# Patient Record
Sex: FEMALE | Race: OTHER
[De-identification: names, ages, dates, MRNs, and addresses within clinical notes are randomized per-mention and may not be internally consistent; named-entity substitution may affect disease eponyms.]

---

## 2019-12-09 ENCOUNTER — HOSPITAL ENCOUNTER (EMERGENCY)
Dept: HOSPITAL 72 - EMR | Age: 39
Discharge: HOME | End: 2019-12-09
Payer: SELF-PAY

## 2019-12-09 VITALS — SYSTOLIC BLOOD PRESSURE: 146 MMHG | DIASTOLIC BLOOD PRESSURE: 97 MMHG

## 2019-12-09 VITALS — BODY MASS INDEX: 22.66 KG/M2 | WEIGHT: 120 LBS | HEIGHT: 61 IN

## 2019-12-09 VITALS — SYSTOLIC BLOOD PRESSURE: 131 MMHG | DIASTOLIC BLOOD PRESSURE: 102 MMHG

## 2019-12-09 DIAGNOSIS — O03.4: Primary | ICD-10-CM

## 2019-12-09 LAB
ADD MANUAL DIFF: NO
ALBUMIN SERPL-MCNC: 4.1 G/DL (ref 3.4–5)
ALBUMIN/GLOB SERPL: 1.1 {RATIO} (ref 1–2.7)
ALP SERPL-CCNC: 60 U/L (ref 46–116)
ALT SERPL-CCNC: 23 U/L (ref 12–78)
ANION GAP SERPL CALC-SCNC: 16 MMOL/L (ref 5–15)
APPEARANCE UR: (no result)
APTT PPP: (no result) S
AST SERPL-CCNC: 17 U/L (ref 15–37)
BASOPHILS NFR BLD AUTO: 0.7 % (ref 0–2)
BILIRUB SERPL-MCNC: 0.3 MG/DL (ref 0.2–1)
BUN SERPL-MCNC: 11 MG/DL (ref 7–18)
CALCIUM SERPL-MCNC: 9 MG/DL (ref 8.5–10.1)
CHLORIDE SERPL-SCNC: 103 MMOL/L (ref 98–107)
CO2 SERPL-SCNC: 21 MMOL/L (ref 21–32)
CREAT SERPL-MCNC: 0.6 MG/DL (ref 0.55–1.3)
EOSINOPHIL NFR BLD AUTO: 3.4 % (ref 0–3)
ERYTHROCYTE [DISTWIDTH] IN BLOOD BY AUTOMATED COUNT: 11.3 % (ref 11.6–14.8)
GLOBULIN SER-MCNC: 3.7 G/DL
GLUCOSE UR STRIP-MCNC: NEGATIVE MG/DL
HCT VFR BLD CALC: 40.3 % (ref 37–47)
HGB BLD-MCNC: 13.7 G/DL (ref 12–16)
KETONES UR QL STRIP: (no result)
LEUKOCYTE ESTERASE UR QL STRIP: (no result)
LYMPHOCYTES NFR BLD AUTO: 42.6 % (ref 20–45)
MCV RBC AUTO: 95 FL (ref 80–99)
MONOCYTES NFR BLD AUTO: 6 % (ref 1–10)
NEUTROPHILS NFR BLD AUTO: 47.2 % (ref 45–75)
NITRITE UR QL STRIP: NEGATIVE
PH UR STRIP: 5 [PH] (ref 4.5–8)
PLATELET # BLD: 320 K/UL (ref 150–450)
POTASSIUM SERPL-SCNC: 3 MMOL/L (ref 3.5–5.1)
PROT UR QL STRIP: (no result)
RBC # BLD AUTO: 4.26 M/UL (ref 4.2–5.4)
SODIUM SERPL-SCNC: 140 MMOL/L (ref 136–145)
SP GR UR STRIP: 1.02 (ref 1–1.03)
UROBILINOGEN UR-MCNC: NORMAL MG/DL (ref 0–1)
WBC # BLD AUTO: 9.3 K/UL (ref 4.8–10.8)

## 2019-12-09 PROCEDURE — 76856 US EXAM PELVIC COMPLETE: CPT

## 2019-12-09 PROCEDURE — 81025 URINE PREGNANCY TEST: CPT

## 2019-12-09 PROCEDURE — 85025 COMPLETE CBC W/AUTO DIFF WBC: CPT

## 2019-12-09 PROCEDURE — 80307 DRUG TEST PRSMV CHEM ANLYZR: CPT

## 2019-12-09 PROCEDURE — 76830 TRANSVAGINAL US NON-OB: CPT

## 2019-12-09 PROCEDURE — 36415 COLL VENOUS BLD VENIPUNCTURE: CPT

## 2019-12-09 PROCEDURE — 84702 CHORIONIC GONADOTROPIN TEST: CPT

## 2019-12-09 PROCEDURE — 83690 ASSAY OF LIPASE: CPT

## 2019-12-09 PROCEDURE — 81003 URINALYSIS AUTO W/O SCOPE: CPT

## 2019-12-09 PROCEDURE — 96360 HYDRATION IV INFUSION INIT: CPT

## 2019-12-09 PROCEDURE — 80053 COMPREHEN METABOLIC PANEL: CPT

## 2019-12-09 PROCEDURE — 99284 EMERGENCY DEPT VISIT MOD MDM: CPT

## 2019-12-09 NOTE — EMERGENCY ROOM REPORT
History of Present Illness


General


Chief Complaint:  Vaginal


Source:  Patient





Present Illness


HPI


Patient presents with complaints of increased heavy vaginal bleeding


Patient reports that the bleeding started several hours ago


Patient reports that she had a miscarriage about 3 months ago saw her


OB/GYN physician was given an injection and has a follow-up in 2 weeks


Her last menstrual cycle was about 3 months ago and she has not had any


Bleeding since then





Denies any abdominal pain denies any vomiting


Denies any back or flank pain denies any dysuria frequency


Allergies:  


Coded Allergies:  


     No Known Allergies (Unverified , 12)





Patient History


Past Medical History:  see triage record


Last Menstrual Period:  now


Pregnant Now:  No


:  5


Para:  4


Reviewed Nursing Documentation:  PMH: Agreed; PSxH: Agreed





Review of Systems


All Other Systems:  negative except mentioned in HPI





Physical Exam





Vital Signs








  Date Time  Temp Pulse Resp B/P (MAP) Pulse Ox O2 Delivery O2 Flow Rate FiO2


 


19 05:19 97.9 115 16 138/88 (105) 98 Room Air  








Sp02 EP Interpretation:  reviewed, normal


General Appearance:  mild distress - Appears uncomfortable


Head:  normocephalic, atraumatic


Eyes:  bilateral eye PERRL, bilateral eye EOMI


ENT:  hearing grossly normal


Neck:  full range of motion, supple


Respiratory:  lungs clear, no respiratory distress, no retraction


Cardiovascular #1:  regular rate, rhythm


Gastrointestinal:  normal bowel sounds, non tender


Genitourinary:  other - Pelvic exam reveals several clots, osseous closed, 

after the clots are removed and packing was used for heme control, there does 

not appear to be any obvious hemorrhage


Musculoskeletal:  normal inspection


Neurologic:  alert, oriented x3


Psychiatric:  normal inspection


Skin:  no rash


Lymphatic:  no adenopathy





Medical Decision Making


Diagnostic Impression:  


 Primary Impression:  


 Incomplete miscarriage


 Additional Impression:  


 Retained products of conception


ER Course


With the patient's history and examination, multiple differentials considered, 

including but not limited to pregnancy, ectopic pregnancy, ovarian torsion, 

gastritis, cholecystitis, pancreatitis, appendicitis


Other differential such as retained products and incomplete miscarriage 

considered given the patient's history





Patient's ultrasound shows some thickening of the lining of the uterus with 

question of retained products





Patient's pregnancy test is negative otherwise on the pelvic exam


Patient shows reduction of active bleeding





At this time patient's hemoglobin also within normal limits hemodynamically 

significantly improved with heart rate in the 80s


And patient requires close OB/GYN follow-up with her specialist


In the next 1 to 2 days





Labs








Test


  19


05:39 19


05:51


 


White Blood Count


  9.3 K/UL


(4.8-10.8) 


 


 


Red Blood Count


  4.26 M/UL


(4.20-5.40) 


 


 


Hemoglobin


  13.7 G/DL


(12.0-16.0) 


 


 


Hematocrit


  40.3 %


(37.0-47.0) 


 


 


Mean Corpuscular Volume 95 FL (80-99)  


 


Mean Corpuscular Hemoglobin


  32.3 PG


(27.0-31.0) 


 


 


Mean Corpuscular Hemoglobin


Concent 34.1 G/DL


(32.0-36.0) 


 


 


Red Cell Distribution Width


  11.3 %


(11.6-14.8) 


 


 


Platelet Count


  320 K/UL


(150-450) 


 


 


Mean Platelet Volume


  5.6 FL


(6.5-10.1) 


 


 


Neutrophils (%) (Auto)


  47.2 %


(45.0-75.0) 


 


 


Lymphocytes (%) (Auto)


  42.6 %


(20.0-45.0) 


 


 


Monocytes (%) (Auto)


  6.0 %


(1.0-10.0) 


 


 


Eosinophils (%) (Auto)


  3.4 %


(0.0-3.0) 


 


 


Basophils (%) (Auto)


  0.7 %


(0.0-2.0) 


 


 


Sodium Level


  140 MMOL/L


(136-145) 


 


 


Potassium Level


  3.0 MMOL/L


(3.5-5.1) 


 


 


Chloride Level


  103 MMOL/L


() 


 


 


Carbon Dioxide Level


  21 MMOL/L


(21-32) 


 


 


Anion Gap


  16 mmol/L


(5-15) 


 


 


Blood Urea Nitrogen


  11 mg/dL


(7-18) 


 


 


Creatinine


  0.6 MG/DL


(0.55-1.30) 


 


 


Estimat Glomerular Filtration


Rate > 60 mL/min


(>60) 


 


 


Glucose Level


  108 MG/DL


() 


 


 


Calcium Level


  9.0 MG/DL


(8.5-10.1) 


 


 


Total Bilirubin


  0.3 MG/DL


(0.2-1.0) 


 


 


Aspartate Amino Transf


(AST/SGOT) 17 U/L (15-37) 


  


 


 


Alanine Aminotransferase


(ALT/SGPT) 23 U/L (12-78) 


  


 


 


Alkaline Phosphatase


  60 U/L


() 


 


 


Total Protein


  7.8 G/DL


(6.4-8.2) 


 


 


Albumin


  4.1 G/DL


(3.4-5.0) 


 


 


Globulin 3.7 g/dL  


 


Albumin/Globulin Ratio 1.1 (1.0-2.7)  


 


Lipase


  198 U/L


() 


 


 


Human Chorionic Gonadotropin,


Quant 10 mIU/mL


(1-6) 


 


 


Serum Alcohol 3 mg/dL  


 


Urine Color  Red 


 


Urine Appearance  Cloudy 


 


Urine pH  5 (4.5-8.0) 


 


Urine Specific Gravity


  


  1.025


(1.005-1.035)


 


Urine Protein  3+ (NEGATIVE) 


 


Urine Glucose (UA)


  


  Negative


(NEGATIVE)


 


Urine Ketones  1+ (NEGATIVE) 


 


Urine Blood  5+ (NEGATIVE) 


 


Urine Nitrite


  


  Negative


(NEGATIVE)


 


Urine Bilirubin


  


  Negative


(NEGATIVE)


 


Urine Urobilinogen


  


  Normal MG/DL


(0.0-1.0)


 


Urine Leukocyte Esterase  1+ (NEGATIVE) 


 


Urine RBC


  


  Tntc /HPF (0 -


2)


 


Urine WBC


  


  0-2 /HPF (0 -


2)


 


Urine Squamous Epithelial


Cells 


  None /LPF


(NONE/OCC)


 


Urine Bacteria


  


  Occasional


/HPF (NONE)


 


Urine HCG, Qualitative


  


  Negative


(NEGATIVE)


 


Urine Opiates Screen


  


  Negative


(NEGATIVE)


 


Urine Barbiturates Screen


  


  Negative


(NEGATIVE)


 


Phencyclidine (PCP) Screen


  


  Negative


(NEGATIVE)


 


Urine Amphetamines Screen


  


  Negative


(NEGATIVE)


 


Urine Benzodiazepines Screen


  


  Negative


(NEGATIVE)


 


Urine Cocaine Screen


  


  Negative


(NEGATIVE)


 


Urine Marijuana (THC) Screen


  


  Negative


(NEGATIVE)








CT/MRI/US Diagnostic Results


CT/MRI/US Diagnostic Results :  


   Impression


Pelvic ultrasoundImpression: Thickened endometrium with mixed echogenicity, 

cystic spaces, and


increased vascularity, raises concern for retained products of conception.


 


No intrauterine pregnancy demonstrated; most likely elevated beta-hCG related to


recent pregnancy but correlation with serial beta-hCGs is recommended, with


consideration for repeat sonography as clinically indicated


 


Negative for adnexal mass





Last Vital Signs








  Date Time  Temp Pulse Resp B/P (MAP) Pulse Ox O2 Delivery O2 Flow Rate FiO2


 


19 05:19 97.9 115 16 138/88 (105) 98 Room Air  








Status:  improved


Disposition:  HOME, SELF-CARE


Condition:  Improved


Scripts


Cephalexin* (KEFLEX*) 500 Mg Capsule


500 MG ORAL EVERY 6 HOURS for 7 Days, CAP


   Prov: Marisela Bledsoe DO         19





Additional Instructions:  


Patient is provided with the discharge instructions notified to follow up with 

primary doctor in the next 2-3 days otherwise return to the er with any 

worsening symptoms.


Please note that this report is being documented using DRAGON technology.  This 

can lead to erroneous entry secondary to incorrect interpretation by the 

dictating instrument.











Marisela Bledsoe DO Dec 9, 2019 05:33

## 2019-12-09 NOTE — DIAGNOSTIC IMAGING REPORT
Indication: Abdominal pain, vaginal bleeding x1 day, 2 months status post

miscarriage, quantitative beta hCG 10

 

Technique: Transabdominal and transvaginal images of the pelvis. Doppler

interrogation of the ovaries

 

Comparison: 9/23/2012

 

Findings: 10 cm length by 5.4 cm AP. The endometrium is thickened, measuring 27 mm

thick, demonstrates some cystic spaces and some hypervascularity in the upper fundus.

No intrauterine pregnancy is demonstrated. The right ovary measures 3.5 cm length.

The left ovary measures 1.9 cm length. Both ovaries demonstrate normal flow on

Doppler. No free cul-de-sac fluid demonstrated.

 

Impression: Thickened endometrium with mixed echogenicity, cystic spaces, and

increased vascularity, raises concern for retained products of conception.

 

No intrauterine pregnancy demonstrated; most likely elevated beta-hCG related to

recent pregnancy but correlation with serial beta-hCGs is recommended, with

consideration for repeat sonography as clinically indicated

 

Negative for adnexal mass

 

This agrees with the preliminary interpretation provided overnight by [facility]

teleradiology service.

## 2019-12-09 NOTE — NUR
ED Nurse Note:



Patient walked in to ER from home d/t heavy vaginal bleeding that started 3 
hours prior to arrival. Per patient, lower abdominal pain 6/10. Patient states 
she had a miscarriage 3 months ago. Denies trauma. Patient aao x 4 and 
ambulatory. Patient placed in gown and cardiac monitor. No acute distress 
noted.

## 2019-12-09 NOTE — NUR
ER DISCHARGE NOTE:



Patient cleared for discharge per ERMD. Patient given discharge instructions 
and prescriptions, verbalized understanding. Patient aao x 4 and ambulatory 
upon discharge. Medical devices and ID band removed. IV removed intact with no 
complications. Patient stable upon discharge.